# Patient Record
Sex: FEMALE | Race: WHITE | NOT HISPANIC OR LATINO | URBAN - METROPOLITAN AREA
[De-identification: names, ages, dates, MRNs, and addresses within clinical notes are randomized per-mention and may not be internally consistent; named-entity substitution may affect disease eponyms.]

---

## 2024-01-08 ENCOUNTER — APPOINTMENT (OUTPATIENT)
Dept: ORTHOPEDIC SURGERY | Facility: CLINIC | Age: 59
End: 2024-01-08
Payer: COMMERCIAL

## 2024-01-16 ENCOUNTER — OFFICE VISIT (OUTPATIENT)
Dept: PAIN MEDICINE | Facility: HOSPITAL | Age: 59
End: 2024-01-16
Payer: COMMERCIAL

## 2024-01-16 DIAGNOSIS — M54.12 RADICULOPATHY, CERVICAL REGION: ICD-10-CM

## 2024-01-16 PROCEDURE — 99214 OFFICE O/P EST MOD 30 MIN: CPT | Performed by: ANESTHESIOLOGY

## 2024-01-16 PROCEDURE — 99204 OFFICE O/P NEW MOD 45 MIN: CPT | Performed by: ANESTHESIOLOGY

## 2024-01-16 RX ORDER — GABAPENTIN 300 MG/1
CAPSULE ORAL
Qty: 180 CAPSULE | Refills: 0 | Status: SHIPPED | OUTPATIENT
Start: 2024-01-16 | End: 2024-01-19

## 2024-01-16 ASSESSMENT — PAIN SCALES - GENERAL: PAINLEVEL: 6

## 2024-01-16 NOTE — PROGRESS NOTES
Chief Complaint   Patient presents with    Neck Pain     History Of Present Illness  Cecilia Colon is a 58 y.o. female presents to pain clinic for management of neck pain. Patient has 2 anterior cervical spine surgeries by Dr. Jay on 2002.  6 months ago patient developed spontaneous neck pain with occasional radicular pain in the left arm.  Endorses no weakness loss of coordination or gait abnormality at this time.  Plain films show a solid fusion at C5-6, and 6 7 -The C7 screws are both fractured.  Endplate device broken in the C7 vertebra. MRI showing multilevel disc degeneration with mild bilateral neuroforaminal stenosis at 3-4, mild stenosis at C4-5 as well as mild stenosis at C7-T1.  Patient initially saw Dr. Gamez as she was having a lot of axial pain with some left upper extremity radicular symptoms.  She said since physical therapy her axial neck pain has resolved and now occasionally has radicular symptoms in her right upper extremity.  She does not describe this as pain but an electrical sensation that occurs twice every week. She has not had injection therapy in the past.  She is not on any neuromodulating medications. The pain causes significant stress in the patient's life, specifically interferes with general activity, mood, walking ability, ability to perform tasks at home and/or work.  She does not endorse any myelopathy signs. Patient participates in physical therapy and continues to perform physician directed exercises at home. Denies any bowel or bladder incontinence, saddle anesthesia, worsening pain, weakness or falls.  She was given referral for physical therapy in April of last year and she did complete that, has been continuing with the exercises 2-3 times a week ongoing which are physician directed as ordered by Dr. Gamez.     Past Medical History  She has a past medical history of Personal history of other diseases of the circulatory system, Personal history of other endocrine,  nutritional and metabolic disease, and Personal history of other mental and behavioral disorders.    Surgical History  She has a past surgical history that includes Neck surgery (10/15/2013) and Foot surgery (10/15/2013).     Social History  She reports that she has never smoked. She does not have any smokeless tobacco history on file. No history on file for alcohol use and drug use.    Family History  No family history on file.     Allergies  Patient has no allergy information on record.    Review of Symptoms:   Constitutional: Negative for chills, diaphoresis or fever  HENT: Negative for neck swelling  Eyes:.  Negative for eye pain  Respiratory:.  Negative for cough, shortness of breath or wheezing    Cardiovascular:.  Negative for chest pain or palpitations  Gastrointestinal:.  Negative for abdominal pain, nausea and vomiting  Genitourinary:.  Negative for urgency  Musculoskeletal:  Positive for back pain. Positive for joint pain. Denies falls within the past 3 months.  Skin: Negative for wounds or itching   Neurological: Negative for dizziness, seizures, loss of consciousness and weakness  Endo/Heme/Allergies: Does not bruise/bleed easily  Psychiatric/Behavioral: Negative for depression. The patient does not appear anxious.       PHYSICAL EXAM  Vitals signs reviewed  Constitutional:       General: Not in acute distress     Appearance: Normal appearance. Not ill-appearing.  HENT:     Head: Normocephalic and atraumatic  Eyes:     Conjunctiva/sclera: Conjunctivae normal  Cardiovascular:     Rate and Rhythm: Normal rate and regular rhythm  Pulmonary:     Effort: No respiratory distress  Abdominal:     Palpations: Abdomen is soft  Musculoskeletal: SHI  Skin:     General: Skin is warm and dry  Neurological:     General: No focal deficit present  Psychiatric:         Mood and Affect: Mood normal         Behavior: Behavior normal    Advanced Exam   Inspection: No gross deformities, no surgical scars  Palpation: No  tenderness in cervical spine  ROM: Normal range of motion  Motor: 5-5 strength upper extremities  Sensory: Negative for sensory abnormalities in upper extremities  Reflexes: 2+ reflexes bilateral upper extremities  Negative Spurling, Negative Jez       Last Recorded Vitals  There were no vitals taken for this visit.    Relevant Results  No current outpatient medications    No results found for this or any previous visit from the past 1000 days.     No image results found.       1. Radiculopathy, cervical region  Referral to Pain Management           ASSESSMENT/PLAN  Cecilia Colon is a 58 y.o. female presents to pain clinic for management of neck pain. Patient has 2 anterior cervical spine surgeries by Dr. Jay dating back to 2002.  Plain films show a solid fusion at C5-6, and 6 7 -The C7 screws are both fractured - endplate device broken in the C7 vertebra. MRI showing multilevel disc degeneration with mild bilateral neuroforaminal stenosis at 3-4, mild stenosis at C4-5 as well as mild stenosis at C7-T1.  Dr. Reddy recommending conservative management at this time.  Patient states her axial neck pain has nearly completely resolved after physical therapy and now she occasionally has right upper extremity radicular symptoms in no particular dermatomal distribution.  Patient would likely benefit from neuromodulating medication and will start gabapentin and titrate to effect.  If patient pain gets worse or increases would recommend cervical epidural at that time.    Our plan is as follows:  - Start gabapentin normal titration.  Side effect profile reviewed.  Risk associated with the medications discussed.  Written instructions given to the patient and we discussed that if we stop it later would wean it down slowly rather than stop it abruptly.  - Can consider cervical epidural steroid injection in the future.  When the patient first made this appointment she had significant pain but no it has abated, we  discussed the procedure itself as well as the risk, benefits, and alternatives.  Will hold off on any intervention for now.  - Continue to participate in physical therapy as well as physician directed home exercises.  - Continue pain medications as prescribed.       Kurtis Potter MD

## 2024-02-26 DIAGNOSIS — M54.16 LUMBAR RADICULOPATHY: ICD-10-CM

## 2024-02-26 RX ORDER — GABAPENTIN 600 MG/1
600 TABLET ORAL 3 TIMES DAILY
Qty: 90 TABLET | Refills: 11 | Status: SHIPPED | OUTPATIENT
Start: 2024-02-26 | End: 2025-02-25

## 2025-04-22 ENCOUNTER — OFFICE VISIT (OUTPATIENT)
Dept: PAIN MEDICINE | Facility: HOSPITAL | Age: 60
End: 2025-04-22
Payer: COMMERCIAL

## 2025-04-22 DIAGNOSIS — M47.817 FACET ARTHROPATHY, LUMBOSACRAL: Primary | ICD-10-CM

## 2025-04-22 DIAGNOSIS — M51.9 LUMBAR DISC DISEASE: ICD-10-CM

## 2025-04-22 PROCEDURE — 99214 OFFICE O/P EST MOD 30 MIN: CPT | Performed by: ANESTHESIOLOGY

## 2025-04-22 NOTE — PROGRESS NOTES
"Subjective   Patient ID: Cecilia Colon is a 60 y.o. female with a past medical history of cervical radiculopathy presents with right-sided low back pain    HPI:   60-year-old female pain clinic for presents now with right-sided low back pain.  Patient reports her neck and arm symptoms are \"cured\" and doing well in that regard.  Low back pain is mainly in the right low back/buttocks area, describes as aching, nonradiating, worse with prolonged sitting, bending and twisting, transitional movements.  Patient reports pain can get anywhere from a 3 to an 8 out of 10 in severity.  Pain is typically worse in the morning when she gets out of bed.  Denies weakness of lower extremities, no numbness or tingling sensation, no bowel or bladder incontinence.  Patient has had chiropractic care, physical therapy, trigger point massage therapy all without significant relief.  Patient has been doing active conservative measures over the past 4 months and despite this, has ongoing and severe pain which is impacting her activities of daily living and quality of life.  Patient currently just taking Tylenol and ibuprofen as needed for pain. Patient stopped taking gabapentin for her neck and arm pain. Patient admits to taking oxycodone that she had leftover from prior surgeries as needed for severe pain.    Patient used to be employed as a  and feels like her chronic back pain was may be in part due to wearing a work belt for many years.  Whereas pain used to improve with conservative treatments as listed above, more recently despite treatment she has ongoing and severe pain.    I have personally reviewed the OARRS report for Cecilia Colon I have considered the risks of abuse, dependence, addiction and diversion    OARRS:  No data recorded  I have personally reviewed the OARRS report for Cecilia Colon. I have considered the risks of abuse, dependence, addiction and diversion    Is the patient prescribed a " combination of a benzodiazepine and opioid?  No  Controlled Substance Agreement:  Reviewed Controlled Substance Agreement including but not limited to the benefits, risks, and alternatives to treatment with a Controlled Substance medication(s).      Review of Systems   13-point ROS done and negative except for HPI.     Current Outpatient Medications   Medication Instructions    gabapentin (Neurontin) 300 mg capsule Take 1 capsule (300 mg) by mouth once daily at bedtime for 1 day, THEN 1 capsule (300 mg) 2 times a day for 1 day, THEN 1 capsule (300 mg) 3 times a day for 1 day. Thereafter, increase by one capsule every third day until taking two capsules three times a day.    gabapentin (NEURONTIN) 600 mg, oral, 3 times daily       Medical History[1]     Surgical History[2]     Family History[3]     RX Allergies[4]     Objective     There were no vitals filed for this visit.     Physical Exam  General: NAD, well groomed, well nourished  Eyes: Non-icteric sclera, EOMI  Ears, Nose, Mouth, and Throat: External ears and nose appear to be without deformity or rash. No lesions or masses noted. Hearing is grossly intact.   Neck: Trachea midline  Respiratory: Nonlabored breathing   Cardiovascular: trace peripheral edema   Skin: No rashes or open lesions/ulcers identified on skin.    Back:   Palpation: No tenderness to palpation over lumbar paraspinous muscles.   Straight leg raise: does not reproduce their pain, bilaterally   CADY Maneuver does not reproduce pain bilaterally  Mild tenderness with facet loading    Neurologic:   Cranial nerves grossly intact.   Strength 5/5 and symmetric plantar/dorsiflexion   Sensation: Normal to light touch throughout, pinprick intact throughout.  DTRs:normal and symmetric throughout  Mccoy: absent  Clonus: absent    Psychiatric: Alert, orientation to person, place, and time. Cooperative.    Imaging personally reviewed and independently interpreted:   XR L spine 9/20/23  There is normal  alignment of the lumbar spine.  There is no evidence of   acute spinal fracture or dislocation.  There are multilevel degenerative   changes with disc space narrowing, marginal endplate osteophytes and   facet joint hypertrophy.     Assessment/Plan   60-year-old female with history of cervical radiculopathy demonstrated clinically does not presents with right-sided low back pain.  Pain is mainly in the low back/buttocks area, nonradiating, worse with bending and twisting, prolonged sitting and transitional movements.  Patient had mild tenderness with facet loading otherwise benign exam.  Pain however has been constant and severe, affecting her activities of daily living.    Plan:  - Will plan for MRI of the lumbar spine to help guide further care.  - Continue physical therapy and home exercise program  -  We discussed the potential for interventional pain procedures.  Procedure, risk, benefits, alternatives reviewed.  Once we have advanced imaging we will discuss the next steps.    The patient has failed treatment with : Physical therapy , alternative therapies, have significant limitations in their sleep quality due to the pain, and have significant limitations of their quality of life due to the pain    We discussed  the risks, benefits and alternatives of the procedure including but not limited to: , Lack of efficacy , Transiently worsening pain , Bleeding, Infection , and Nerve Damage    Follow up: After procedure    The patient was invited to contact us back anytime with any questions or concerns and follow-up with us in the office as needed.     Diagnoses and all orders for this visit:  Facet arthropathy, lumbosacral      This note was generated with the aid of dictation software, there may be typos despite my attempts at proofreading.     Patient seen and plan of care discussed with Dr. Santos Blancas MD  Pain Fellow         [1]   Past Medical History:  Diagnosis Date    Personal history of other  diseases of the circulatory system     History of hypertension    Personal history of other endocrine, nutritional and metabolic disease     History of hypercholesterolemia    Personal history of other mental and behavioral disorders     History of depression   [2]   Past Surgical History:  Procedure Laterality Date    FOOT SURGERY  10/15/2013    Foot Surgery    NECK SURGERY  10/15/2013    Neck Surgery   [3] No family history on file.  [4] No Known Allergies

## 2025-05-01 DIAGNOSIS — M54.16 LUMBAR RADICULOPATHY: ICD-10-CM

## 2025-05-17 NOTE — H&P
Pain Management H&P    History Of Present Illness  Cecilia Colon is a 60 y.o. female presents for procedure state below. Endorses no changes in past medical history or medical health since last seen in clinic.      Past Medical History  She has a past medical history of Personal history of other diseases of the circulatory system, Personal history of other endocrine, nutritional and metabolic disease, and Personal history of other mental and behavioral disorders.    Surgical History  She has a past surgical history that includes Neck surgery (10/15/2013) and Foot surgery (10/15/2013).     Social History  She reports that she has never smoked. She does not have any smokeless tobacco history on file. No history on file for alcohol use and drug use.    Family History  Family History[1]     Allergies  Patient has no known allergies.    Review of Symptoms:   Constitutional: Negative for chills, diaphoresis or fever  HENT: Negative for neck swelling  Eyes:.  Negative for eye pain  Respiratory:.  Negative for cough, shortness of breath or wheezing    Cardiovascular:.  Negative for chest pain or palpitations  Gastrointestinal:.  Negative for abdominal pain, nausea and vomiting  Genitourinary:.  Negative for urgency  Musculoskeletal:  Positive for back pain. Positive for joint pain. Denies falls within the past 3 months.  Skin: Negative for wounds or itching   Neurological: Negative for dizziness, seizures, loss of consciousness and weakness  Endo/Heme/Allergies: Does not bruise/bleed easily  Psychiatric/Behavioral: Negative for depression. The patient does not appear anxious.      Pre-sedation Evaluation  ASA class 2  Mallampati score 2     PHYSICAL EXAM  Vitals signs reviewed  Constitutional:       General: Not in acute distress     Appearance: Normal appearance. Not ill-appearing.  HENT:     Head: Normocephalic and atraumatic  Eyes:     Conjunctiva/sclera: Conjunctivae normal  Cardiovascular:     Rate and Rhythm:  Normal rate and regular rhythm  Pulmonary:     Effort: No respiratory distress  Abdominal:     Palpations: Abdomen is soft  Musculoskeletal: SHI  Skin:     General: Skin is warm and dry  Neurological:     General: No focal deficit present  Psychiatric:         Mood and Affect: Mood normal         Behavior: Behavior normal     Last Recorded Vitals  There were no vitals taken for this visit.    Relevant Results  Current Outpatient Medications   Medication Instructions    gabapentin (Neurontin) 300 mg capsule Take 1 capsule (300 mg) by mouth once daily at bedtime for 1 day, THEN 1 capsule (300 mg) 2 times a day for 1 day, THEN 1 capsule (300 mg) 3 times a day for 1 day. Thereafter, increase by one capsule every third day until taking two capsules three times a day.    gabapentin (NEURONTIN) 600 mg, oral, 3 times daily         XR cervical spine 2-3 views 12/05/2024    Narrative  * * *Final Report* * *    DATE OF EXAM: Dec  5 2024  9:37AM    TWX   5309  -  XR CERVICAL 3V AP/LAT/ODON  / ACCESSION #  [id number]    PROCEDURE REASON: Trauma    * * * * Physician Interpretation * * * *    CERVICAL SPINE X-RAY    TECHNIQUE: XR CERVICAL 3V AP/LAT/ODON    COMPARISON: None    INDICATION:  Patient states she slipped and fell. C/O hitting face on  truck running board.    RESULT:  Cervical spine is normally aligned.  Anterior plate and screw  fixation at C7-T1.  The vertebral body screws at C7 are fractured.  Suspect interbody cage at C5-6.  Moderate disc height loss from C3-4  through C6-7.  Osteophyte formation from C2 through C5.  Vertebral body  heights are maintained.  Prevertebral soft tissues are within normal  limits.  Lateral masses are symmetric relative to the dens.  Cervicothoracic junction is located.  -    Impression  IMPRESSION:    Postsurgical changes lower cervical spine.  C7 screws are fractured.  Multilevel degenerative disc disease.          : DIANNE  Transcribe Date/Time: Dec  5 2024   9:42A    Dictated by : LINUS SHEA MD    This examination was interpreted and the report reviewed and  electronically signed by:  LINUS SHEA MD on Dec  5 2024  9:45AM  EST       ASSESSMENT/PLAN  Cecilia Colon is a 60 y.o. female here for right L4 and L5 TFESI under fluoroscopy    Patient denies any recent antibiotic use or infections, denies any blood thinner use, and denies contrast or local anesthetic allergies     Risks, benefits, alternatives discussed. All questions answered to the best of my ability. Patient agrees to proceed.      Our plan is as follows:  - Proceed with aforementioned procedure          Vignesh Sethi DO   Pain fellow          [1] No family history on file.

## 2025-05-19 ENCOUNTER — HOSPITAL ENCOUNTER (OUTPATIENT)
Facility: HOSPITAL | Age: 60
Discharge: HOME | End: 2025-05-19
Payer: COMMERCIAL

## 2025-05-19 VITALS
OXYGEN SATURATION: 98 % | TEMPERATURE: 98.6 F | SYSTOLIC BLOOD PRESSURE: 93 MMHG | HEART RATE: 69 BPM | RESPIRATION RATE: 16 BRPM | DIASTOLIC BLOOD PRESSURE: 60 MMHG

## 2025-05-19 DIAGNOSIS — M54.16 LUMBAR RADICULOPATHY: ICD-10-CM

## 2025-05-19 PROCEDURE — 2500000004 HC RX 250 GENERAL PHARMACY W/ HCPCS (ALT 636 FOR OP/ED): Performed by: ANESTHESIOLOGY

## 2025-05-19 PROCEDURE — 64483 NJX AA&/STRD TFRM EPI L/S 1: CPT | Mod: RT | Performed by: ANESTHESIOLOGY

## 2025-05-19 PROCEDURE — 3700000012 HC SEDATION LEVEL 5+ TIME - INITIAL 15 MINUTES 5/> YEARS

## 2025-05-19 PROCEDURE — 64484 NJX AA&/STRD TFRM EPI L/S EA: CPT | Performed by: ANESTHESIOLOGY

## 2025-05-19 PROCEDURE — 2720000007 HC OR 272 NO HCPCS

## 2025-05-19 PROCEDURE — 64484 NJX AA&/STRD TFRM EPI L/S EA: CPT | Mod: RT | Performed by: ANESTHESIOLOGY

## 2025-05-19 PROCEDURE — 2550000001 HC RX 255 CONTRASTS: Performed by: ANESTHESIOLOGY

## 2025-05-19 PROCEDURE — 64483 NJX AA&/STRD TFRM EPI L/S 1: CPT | Performed by: ANESTHESIOLOGY

## 2025-05-19 RX ORDER — LIDOCAINE HYDROCHLORIDE 5 MG/ML
INJECTION, SOLUTION INFILTRATION; INTRAVENOUS
Status: COMPLETED | OUTPATIENT
Start: 2025-05-19 | End: 2025-05-19

## 2025-05-19 RX ORDER — MIDAZOLAM HYDROCHLORIDE 1 MG/ML
INJECTION, SOLUTION INTRAMUSCULAR; INTRAVENOUS
Status: COMPLETED | OUTPATIENT
Start: 2025-05-19 | End: 2025-05-19

## 2025-05-19 RX ORDER — DEXAMETHASONE SODIUM PHOSPHATE 10 MG/ML
INJECTION INTRAMUSCULAR; INTRAVENOUS
Status: COMPLETED | OUTPATIENT
Start: 2025-05-19 | End: 2025-05-19

## 2025-05-19 RX ADMIN — LIDOCAINE HYDROCHLORIDE 8 ML: 5 INJECTION, SOLUTION INFILTRATION at 13:54

## 2025-05-19 RX ADMIN — MIDAZOLAM 2 MG: 1 INJECTION INTRAMUSCULAR; INTRAVENOUS at 13:43

## 2025-05-19 RX ADMIN — DEXAMETHASONE SODIUM PHOSPHATE 10 MG: 10 INJECTION, SOLUTION INTRAMUSCULAR; INTRAVENOUS at 13:54

## 2025-05-19 RX ADMIN — IOHEXOL 5 ML: 240 INJECTION, SOLUTION INTRATHECAL; INTRAVASCULAR; INTRAVENOUS; ORAL at 13:54

## 2025-05-19 ASSESSMENT — PAIN - FUNCTIONAL ASSESSMENT
PAIN_FUNCTIONAL_ASSESSMENT: 0-10
PAIN_FUNCTIONAL_ASSESSMENT: WONG-BAKER FACES
PAIN_FUNCTIONAL_ASSESSMENT: 0-10
PAIN_FUNCTIONAL_ASSESSMENT: WONG-BAKER FACES
PAIN_FUNCTIONAL_ASSESSMENT: WONG-BAKER FACES

## 2025-05-19 ASSESSMENT — PAIN DESCRIPTION - DESCRIPTORS: DESCRIPTORS: ACHING;SORE

## 2025-05-19 ASSESSMENT — PAIN SCALES - GENERAL
PAINLEVEL_OUTOF10: 7
PAINLEVEL_OUTOF10: 5 - MODERATE PAIN

## 2025-05-19 ASSESSMENT — COLUMBIA-SUICIDE SEVERITY RATING SCALE - C-SSRS
2. HAVE YOU ACTUALLY HAD ANY THOUGHTS OF KILLING YOURSELF?: NO
1. IN THE PAST MONTH, HAVE YOU WISHED YOU WERE DEAD OR WISHED YOU COULD GO TO SLEEP AND NOT WAKE UP?: NO
6. HAVE YOU EVER DONE ANYTHING, STARTED TO DO ANYTHING, OR PREPARED TO DO ANYTHING TO END YOUR LIFE?: NO

## 2025-05-19 NOTE — DISCHARGE INSTRUCTIONS
DISCHARGE INSTRUCTIONS FOR INJECTIONS     You underwent right lumbar transforaminal epidural steroid injection today    After most injections, it is recommended that you relax and limit your activity for the remainder of the day unless you have been told otherwise by your pain physician.  You should not drive a car, operate machinery, or make important legal decisions unless otherwise directed by your pain physician.  You may resume your normal activity, including exercise, tomorrow.      Keep a written pain diary of how much pain relief you experienced following the injection procedure and the length of time of pain relief you experienced pain relief. Following diagnostic injections like medial branch nerve blocks, sacroiliac joint blocks, stellate ganglion injections and other blocks, it is very important you record the specific amount of pain relief you experienced immediately after the injectionand how long it lasted. Your doctor will ask you for this information at your follow up visit.     For all injections, please keep the injection site dry and inspect the site for a couple of days. You may remove the Band-Aid the day of the injection at any time.     Some discomfort, bruising or slight swelling may occur at the injection site. This is not abnormal if it occurs.  If needed you may:    -Take over the counter medication such as Tylenol or Motrin.   -Apply an ice pack for 30 minutes, 2 to 3 times a day for the first 24 hours.     You may shower today; no soaking baths, hot tubs, whirlpools or swimming pools for two days.      If you are given steroids in your injection, it may take 3-5 days for the steroid medication to take effect. You may notice a worsening of your symptoms for 1-2 days after the injection. This is not abnormal.  You may use acetaminophen, ibuprofen, or prescription medication that your doctor may have prescribed for you if you need to do so.     A few common side effects of steroids include  facial flushing, sweating, restlessness, irritability,difficulty sleeping, increase in blood sugar, and increased blood pressure. If you have diabetes, please monitor your blood sugar at least once a day for at least 5 days. If you have poorly controlled high blood pressure, monitoryour blood pressure for at least 2 days and contact your primary care physician if these numbers are unusually high for you.      If you take aspirin or non-steroidal anti-inflammatory drugs (examples are Motrin, Advil, ibuprofen, Naprosyn, Voltaren, Relafen, etc.) you may restart these this evening, but stop taking it 3 days before your next appointment, unless instructed otherwiseby your physician.      You do not need to discontinue non-aspirin-containing pain medications prior to an injection (examples: Celebrex, tramadol, hydrocodone and acetaminophen).      If you take a blood thinning medication (Coumadin, Lovenox, Fragmin,Ticlid, Plavix, Pradaxa, etc.), please discuss this with your primary care physician/cardiologist and your pain physician. These medications MUST be discontinued before you can have an injection safely, without the risk of uncontrolled bleeding. If these medications are not discontinued for an appropriate period of time, you will not be able to receivean injection. Please adhere to instructions given to you about when to restart your blood thinning medication. If you have any questions please reach out to our team.    If you are taking Coumadin, please have your INR checked the morning of your procedure and bring the result to your appointment unless otherwise instructed. If your INR is over 1.2, your injection may need to be rescheduled to avoid uncontrolled bleeding from the needle placement.     Call [facility]  and ask for Pain Management at [phone] between 8am-4pm Monday - Friday if you are experiencing the following:    If you received an epidural or spinal injection:    -Headache that doesnot go away  with medicine, is worse when sitting or standing up, and is greatly relieved upon lying down.   -Severe pain worse than or different than your baseline pain.   -Chills or fever (101º F or greater).   -Drainage or signs of infection at the injection site     Go directly to the Emergency Department if you are experiencing the following and received an epidural or spinal injection:   -Abrupt weakness or progressive weakness in your legs that starts after you leave the clinic.   -Abrupt severe or worsening numbness in your legs.   -Inability to urinate after the injection or loss of bowel or bladder control without the urge to defecate or urinate.     If you have a clinical question that cannot wait until your next appointment, please call [phone] between 8am-4pm Monday - Friday or send a [employer] message. We do our best to return all non-emergency messages within 24 hours, Monday - Friday. A nurse or physician will return your message. You may also try calling Dr. Santos Strickland's nurse ([phone]), and they will do their best to answer your question(s).    If you need to cancel an appointment, please call the scheduling staff at [phone] during normal business hours or leave a message at least 24 hours in advance.     If you are going to be sedated for your next procedure, you MUST have responsible adult who can legally drive accompany you home. You cannot eat or drink for at least eight hours prior to the planned procedure if you are going to receive sedation. You may take your non-blood thinning medications with a small sip of water.

## 2025-05-30 DIAGNOSIS — M54.16 LUMBAR RADICULOPATHY: ICD-10-CM

## 2025-05-30 RX ORDER — HYDROCODONE BITARTRATE AND ACETAMINOPHEN 5; 325 MG/1; MG/1
1 TABLET ORAL 3 TIMES DAILY PRN
Qty: 21 TABLET | Refills: 0 | Status: SHIPPED | OUTPATIENT
Start: 2025-05-30 | End: 2025-06-06

## 2025-06-13 ENCOUNTER — APPOINTMENT (OUTPATIENT)
Dept: ORTHOPEDIC SURGERY | Facility: CLINIC | Age: 60
End: 2025-06-13
Payer: COMMERCIAL

## 2025-06-13 DIAGNOSIS — M71.38 SYNOVIAL CYST OF LUMBAR FACET JOINT: ICD-10-CM

## 2025-06-13 DIAGNOSIS — Q76.49 CONGENITAL SPINAL STENOSIS OF LUMBAR REGION: Primary | ICD-10-CM

## 2025-06-13 PROCEDURE — 99204 OFFICE O/P NEW MOD 45 MIN: CPT | Performed by: ORTHOPAEDIC SURGERY

## 2025-06-13 NOTE — PROGRESS NOTES
HPI:Cecilia Colon is a 60-year-old woman who comes in with a greater than 6-month history of chronic and constant right sided low back pain.  It is worse with forward flexion.  She does not have classic claudication symptoms.  She has seen a chiropractor.  She had a recent steroid injection with no significant improvement.  She denies any specific pain down the leg.      ROS:  Reviewed on EMR and patient intake sheet.    PMH/SH:  Reviewed on EMR and patient intake sheet.    Exam:  Physical Exam    Constitutional: Well appearing; no acute distress  Eyes: pupils are equal and round  Psych: normal affect  Respiratory: non-labored breathing  Cardiovascular: regular rate and rhythm  GI: non-distended abdomen  Musculoskeletal: no pain with range of motion of the hips bilaterally  Neurologic: [5]/5 strength in the lower extremities bilaterally]; [negative] straight leg raise    Radiology:     MRI from [facility] was personally reviewed.  It demonstrates multilevel congenital stenosis.  Moderate stenosis at L2-3 and L3-4, and severe stenosis at L4-5 which appears to be in part secondary to a right synovial facet cyst.  The MRI is limited secondary to its poor quality.    Diagnosis:    Lumbar stenosis    Assessment and Plan:   60-year-old woman with chronic and constant right sided low back pain.  MRI does demonstrate significant spinal stenosis and perhaps a synovial facet cyst on the right side at L4-5.  Interestingly, the patient does not have classic neurologic symptoms.  At this time, I have recommended physical therapy.  If the patient symptoms remain intolerable despite therapy, then surgical intervention would be the last step, understanding that the patient's symptoms are not classic for spinal stenosis and symptomatic relief may not respond in the usual fashion.  We discussed an L4-5 decompression and cyst removal with possible fusion as a last resort.  Patient will follow-up with me, if the symptoms  remain intolerable despite upcoming physical therapy.    At the end of the visit, I asked the patient if there were any further questions.  Although no further questions were provided at this time, I would be happy to see the patient back in the future to discuss any further questions or concerns.      Jose Radford MD    Chief of Spine Surgery, [facility]  Director of Spine Service, [facility]  , Department of Orthopaedics  [facility] [employer]  [address] [place] [address]  P: [phone]  [facility]    This note was dictated with voice recognition software.  It has not been proofread for grammatical errors, typographical mistakes or other semantic inconsistencies.

## 2025-06-13 NOTE — LETTER
June 13, 2025     Brigitte Barlow, APRN-CNP  [address]  [place] [address]    Patient: Cecilia Colon   YOB: 1965   Date of Visit: 6/13/2025       Dear Dr. Brigitte Barlow, APRN-CNP:    Thank you for referring Cecilia Colon to me for evaluation. Below are my notes for this consultation.  If you have questions, please do not hesitate to call me. I look forward to following your patient along with you.       Sincerely,     Jose Radford MD      CC: Li Graham MD  ______________________________________________________________________________________    HPI:Cecilia Colon is a 60-year-old woman who comes in with a greater than 6-month history of chronic and constant right sided low back pain.  It is worse with forward flexion.  She does not have classic claudication symptoms.  She has seen a chiropractor.  She had a recent steroid injection with no significant improvement.  She denies any specific pain down the leg.      ROS:  Reviewed on EMR and patient intake sheet.    PMH/SH:  Reviewed on EMR and patient intake sheet.    Exam:  Physical Exam    Constitutional: Well appearing; no acute distress  Eyes: pupils are equal and round  Psych: normal affect  Respiratory: non-labored breathing  Cardiovascular: regular rate and rhythm  GI: non-distended abdomen  Musculoskeletal: no pain with range of motion of the hips bilaterally  Neurologic: [5]/5 strength in the lower extremities bilaterally]; [negative] straight leg raise    Radiology:     MRI from [facility] was personally reviewed.  It demonstrates multilevel congenital stenosis.  Moderate stenosis at L2-3 and L3-4, and severe stenosis at L4-5 which appears to be in part secondary to a right synovial facet cyst.  The MRI is limited secondary to its poor quality.    Diagnosis:    Lumbar stenosis    Assessment and Plan:   60-year-old woman with chronic and constant right sided low back pain.  MRI does  demonstrate significant spinal stenosis and perhaps a synovial facet cyst on the right side at L4-5.  Interestingly, the patient does not have classic neurologic symptoms.  At this time, I have recommended physical therapy.  If the patient symptoms remain intolerable despite therapy, then surgical intervention would be the last step, understanding that the patient's symptoms are not classic for spinal stenosis and symptomatic relief may not respond in the usual fashion.  We discussed an L4-5 decompression and cyst removal with possible fusion as a last resort.  Patient will follow-up with me, if the symptoms remain intolerable despite upcoming physical therapy.    At the end of the visit, I asked the patient if there were any further questions.  Although no further questions were provided at this time, I would be happy to see the patient back in the future to discuss any further questions or concerns.      Jose Radford MD    Chief of Spine Surgery, [facility]  Director of Spine Service, [facility]  , Department of Orthopaedics  [facility] [place] [facility] [employer] [facility]  [address] [place] [address]  P: [phone]  [facility] HospitalDiff[facility] PharmaceuticalsOh[facility]    This note was dictated with voice recognition software.  It has not been proofread for grammatical errors, typographical mistakes or other semantic inconsistencies.